# Patient Record
(demographics unavailable — no encounter records)

---

## 2025-03-10 NOTE — CARDIOLOGY SUMMARY
[de-identified] : 3/10/25 Sinus bradycardia 58, NSST [de-identified] : echo 2/17/25 with LVEF 60%  [de-identified] : [facility] 2/18/25 with mild non obstructive CAD

## 2025-03-10 NOTE — HISTORY OF PRESENT ILLNESS
[FreeTextEntry1] : Mr. Colton Luz is a 44 y/o man with Hx of HTN, HLD, GERD who presents for an initial evaluation for new PAF. He had chest discomfort on 2/15 while exercising. On weekends, he does cardo for 1 1/2 hours and elliptical for 8 miles. He felt winded after and then did 6 miles and felt he had to stop. He went to [facility] o 2/16/25 and had an EKG with NSR with TWI V2 and he was sent to the ED for evaluation. He had labs including troponins and were negative. He was discharged after he had an echo 2/17/25 with LVEF 60% and angiogram 2/18/25 with mild non obstructive CAD. He did have Afib for a short duration while on the monitor. He had a Holter 2/18 and he said they found he had Afib and was started on oral a/c. He is still wearing the [employer] now. He is active and works in tech industry and his wife is a . He has no current symptoms. He had an EKG 2/16/25 with AF with VR 99 bpm. Maintained on diltiazem and Eliquis with no s/s of bleeding. Denies chest pain, palpitations, SOB, syncope or near syncope.

## 2025-03-10 NOTE — DISCUSSION/SUMMARY
[EKG obtained to assist in diagnosis and management of assessed problem(s)] : EKG obtained to assist in diagnosis and management of assessed problem(s) [FreeTextEntry1] : Impression:  1. PAF:  EKG performed today to assess for presence of conduction disease, pre-excitation or atrial fibrillation reveals sinus bradycardia. Maintained on diltiazem and Eliquis with no s/s of bleeding. Discussed options for AF.  Discussed treatment options for afib including rate control vs antiarrhythmics vs possible ablation. Given recurrent symptomatic afib despite rate control management and young age/preference to avoid antiarrhythmics, recommend undergoing possible afib ablation.  Risks, benefits, and alternatives to procedure discussed at length. Risks including that of bleeding, infection, stroke, and cardiac tamponade discussed and he verbalizes understanding of all. Will hold all medications the morning of the ablation. May take all regularly scheduled medications the night before.  2. HTN; Encourage heart healthy diet, sodium restriction and weight management.. Continue regular f/u with Cardiologist for further HTN management.  Schedule Afib ablation   Continue f/u with Cardiologist and RTO for f/u after ablation

## 2025-04-05 NOTE — PHYSICAL EXAM
[de-identified] : PLEASE SEE HPI FOR ADDITIONAL RELEVANT PHYSICAL EXAM FINDINGS GENERAL: no acute distress, nontoxic HEAD: normocephalic EYES: no scleral icterus NECK: trachea midline, Full ROM/supple RESP: no respiratory distress, no obvious wheeze or stridor, no accessory muscle use noted ABD: nondistended MSK: no gross deformity NEURO: alert & fully oriented SKIN: no rash PSYCH: cooperative, good insight, appropriate, fluent speech

## 2025-04-05 NOTE — PLAN
[FreeTextEntry1] : Cefdinir Rx'd CXR ordered APAP, dayquil as directed ED precautions discussed PCP f/u

## 2025-04-05 NOTE — ASSESSMENT
[FreeTextEntry1] : URI v PNA; no SOB, breathlessness, and patient is nontoxic on exam, no dyspnea or accessory muscle use, and is ambulating throughout residence without difficulty

## 2025-04-05 NOTE — HISTORY OF PRESENT ILLNESS
[FreeTextEntry8] : 46 y/o M with Hx of HTN, HLD, GERD, recently dx c pAF on eliquis and cardizem c several days cough, congestion, malaise.  2 days of symptoms.  +chills and bodyaches.   No CP, SOB, hemoptysis, exercise intolerance Son sick conctact -- dx c PNA  No travel